# Patient Record
Sex: FEMALE | Race: OTHER | NOT HISPANIC OR LATINO
[De-identification: names, ages, dates, MRNs, and addresses within clinical notes are randomized per-mention and may not be internally consistent; named-entity substitution may affect disease eponyms.]

---

## 2017-03-20 ENCOUNTER — APPOINTMENT (OUTPATIENT)
Dept: NUCLEAR MEDICINE | Facility: HOSPITAL | Age: 72
End: 2017-03-20

## 2017-03-20 ENCOUNTER — OUTPATIENT (OUTPATIENT)
Dept: OUTPATIENT SERVICES | Facility: HOSPITAL | Age: 72
LOS: 1 days | End: 2017-03-20
Payer: MEDICARE

## 2017-03-20 DIAGNOSIS — R06.00 DYSPNEA, UNSPECIFIED: ICD-10-CM

## 2017-03-20 PROCEDURE — 78582 LUNG VENTILAT&PERFUS IMAGING: CPT | Mod: 26

## 2017-03-20 PROCEDURE — 71046 X-RAY EXAM CHEST 2 VIEWS: CPT

## 2017-03-20 PROCEDURE — 71020: CPT | Mod: 26

## 2017-03-20 PROCEDURE — 78582 LUNG VENTILAT&PERFUS IMAGING: CPT

## 2021-12-10 ENCOUNTER — APPOINTMENT (OUTPATIENT)
Dept: MRI IMAGING | Facility: IMAGING CENTER | Age: 76
End: 2021-12-10
Payer: MEDICARE

## 2021-12-10 ENCOUNTER — OUTPATIENT (OUTPATIENT)
Dept: OUTPATIENT SERVICES | Facility: HOSPITAL | Age: 76
LOS: 1 days | End: 2021-12-10
Payer: MEDICARE

## 2021-12-10 DIAGNOSIS — Z00.8 ENCOUNTER FOR OTHER GENERAL EXAMINATION: ICD-10-CM

## 2021-12-10 PROCEDURE — C8937: CPT

## 2021-12-10 PROCEDURE — 77049 MRI BREAST C-+ W/CAD BI: CPT | Mod: 26,MH

## 2021-12-10 PROCEDURE — A9585: CPT

## 2021-12-10 PROCEDURE — C8908: CPT | Mod: MH

## 2021-12-27 ENCOUNTER — OUTPATIENT (OUTPATIENT)
Dept: OUTPATIENT SERVICES | Facility: HOSPITAL | Age: 76
LOS: 1 days | End: 2021-12-27
Payer: MEDICARE

## 2021-12-27 DIAGNOSIS — C50.919 MALIGNANT NEOPLASM OF UNSPECIFIED SITE OF UNSPECIFIED FEMALE BREAST: ICD-10-CM

## 2021-12-28 ENCOUNTER — RESULT REVIEW (OUTPATIENT)
Age: 76
End: 2021-12-28

## 2021-12-28 PROCEDURE — 88321 CONSLTJ&REPRT SLD PREP ELSWR: CPT

## 2022-01-04 ENCOUNTER — OUTPATIENT (OUTPATIENT)
Dept: OUTPATIENT SERVICES | Facility: HOSPITAL | Age: 77
LOS: 1 days | End: 2022-01-04
Payer: MEDICARE

## 2022-01-04 VITALS
HEART RATE: 84 BPM | RESPIRATION RATE: 16 BRPM | TEMPERATURE: 98 F | DIASTOLIC BLOOD PRESSURE: 80 MMHG | OXYGEN SATURATION: 98 % | WEIGHT: 139.99 LBS | SYSTOLIC BLOOD PRESSURE: 110 MMHG | HEIGHT: 64 IN

## 2022-01-04 DIAGNOSIS — Z87.09 PERSONAL HISTORY OF OTHER DISEASES OF THE RESPIRATORY SYSTEM: ICD-10-CM

## 2022-01-04 DIAGNOSIS — I10 ESSENTIAL (PRIMARY) HYPERTENSION: ICD-10-CM

## 2022-01-04 DIAGNOSIS — E89.2 POSTPROCEDURAL HYPOPARATHYROIDISM: Chronic | ICD-10-CM

## 2022-01-04 DIAGNOSIS — E03.9 HYPOTHYROIDISM, UNSPECIFIED: ICD-10-CM

## 2022-01-04 DIAGNOSIS — C50.912 MALIGNANT NEOPLASM OF UNSPECIFIED SITE OF LEFT FEMALE BREAST: ICD-10-CM

## 2022-01-04 DIAGNOSIS — Z98.890 OTHER SPECIFIED POSTPROCEDURAL STATES: Chronic | ICD-10-CM

## 2022-01-04 DIAGNOSIS — C50.919 MALIGNANT NEOPLASM OF UNSPECIFIED SITE OF UNSPECIFIED FEMALE BREAST: ICD-10-CM

## 2022-01-04 LAB
A1C WITH ESTIMATED AVERAGE GLUCOSE RESULT: 5.9 % — HIGH (ref 4–5.6)
ANION GAP SERPL CALC-SCNC: 11 MMOL/L — SIGNIFICANT CHANGE UP (ref 7–14)
BUN SERPL-MCNC: 22 MG/DL — SIGNIFICANT CHANGE UP (ref 7–23)
CALCIUM SERPL-MCNC: 9.7 MG/DL — SIGNIFICANT CHANGE UP (ref 8.4–10.5)
CHLORIDE SERPL-SCNC: 100 MMOL/L — SIGNIFICANT CHANGE UP (ref 98–107)
CO2 SERPL-SCNC: 25 MMOL/L — SIGNIFICANT CHANGE UP (ref 22–31)
CREAT SERPL-MCNC: 0.87 MG/DL — SIGNIFICANT CHANGE UP (ref 0.5–1.3)
ESTIMATED AVERAGE GLUCOSE: 123 — SIGNIFICANT CHANGE UP
GLUCOSE SERPL-MCNC: 89 MG/DL — SIGNIFICANT CHANGE UP (ref 70–99)
HCT VFR BLD CALC: 41.6 % — SIGNIFICANT CHANGE UP (ref 34.5–45)
HGB BLD-MCNC: 14.1 G/DL — SIGNIFICANT CHANGE UP (ref 11.5–15.5)
MCHC RBC-ENTMCNC: 30.5 PG — SIGNIFICANT CHANGE UP (ref 27–34)
MCHC RBC-ENTMCNC: 33.9 GM/DL — SIGNIFICANT CHANGE UP (ref 32–36)
MCV RBC AUTO: 89.8 FL — SIGNIFICANT CHANGE UP (ref 80–100)
NRBC # BLD: 0 /100 WBCS — SIGNIFICANT CHANGE UP
NRBC # FLD: 0 K/UL — SIGNIFICANT CHANGE UP
PLATELET # BLD AUTO: 244 K/UL — SIGNIFICANT CHANGE UP (ref 150–400)
POTASSIUM SERPL-MCNC: 3.6 MMOL/L — SIGNIFICANT CHANGE UP (ref 3.5–5.3)
POTASSIUM SERPL-SCNC: 3.6 MMOL/L — SIGNIFICANT CHANGE UP (ref 3.5–5.3)
RBC # BLD: 4.63 M/UL — SIGNIFICANT CHANGE UP (ref 3.8–5.2)
RBC # FLD: 13.4 % — SIGNIFICANT CHANGE UP (ref 10.3–14.5)
SODIUM SERPL-SCNC: 136 MMOL/L — SIGNIFICANT CHANGE UP (ref 135–145)
SURGICAL PATHOLOGY STUDY: SIGNIFICANT CHANGE UP
WBC # BLD: 6.67 K/UL — SIGNIFICANT CHANGE UP (ref 3.8–10.5)
WBC # FLD AUTO: 6.67 K/UL — SIGNIFICANT CHANGE UP (ref 3.8–10.5)

## 2022-01-04 PROCEDURE — 93010 ELECTROCARDIOGRAM REPORT: CPT

## 2022-01-04 RX ORDER — LEVOTHYROXINE SODIUM 125 MCG
1 TABLET ORAL
Qty: 0 | Refills: 0 | DISCHARGE

## 2022-01-04 NOTE — H&P PST ADULT - HISTORY OF PRESENT ILLNESS
Pt is a 76 yr old female scheduled for left Breast Lumpectomy Post Mag Seed Placement at 1 site, Left [place] Lymph Node Biopsy with Dr Cheung tentatively 1/18/22 - pt had Mammo and had Biopsy done - now for surgery to remove lump - pt denies pain or swelling - Pt hx Hypothyroid, sarcoidosis, HLD, COPD, asthma   Patient instructed to contact surgeon's office concerning COVID test prior to surgery    Pt is a 76 yr old female scheduled for left Breast Lumpectomy Post Mag Seed Placement at 1 site, Left [place] Lymph Node Biopsy with Dr Cheung tentatively 1/18/22 - pt had Mammo and had Biopsy done - now for surgery to remove lump - pt denies pain or swelling - Pt hx Hypothyroid, sarcoidosis, HLD, COPD, asthma, sleep apnea   Patient instructed to contact surgeon's office concerning COVID test prior to surgery    Pt is a 76 yr old female scheduled for left Breast Lumpectomy Post Mag Seed Placement at 1 site, Left [place] Lymph Node Biopsy with Dr Cheung tentatively 1/18/22 - pt had Mammo with abnormal results  and had Biopsy done - now for surgery to remove lump - pt denies pain or swelling - Pt hx Hypothyroid, sarcoidosis, HLD, COPD, asthma, sleep apnea   Patient instructed to contact surgeon's office concerning COVID test prior to surgery   Call to Dr Payne to confirm patient may be done in [facility]

## 2022-01-04 NOTE — H&P PST ADULT - NSICDXPASTMEDICALHX_GEN_ALL_CORE_FT
PAST MEDICAL HISTORY:  Age related osteoporosis     Anxiety     Asthma     Breast CA     DM (diabetes mellitus)     H/O parathyroidectomy     H/O varicose vein ligation and stripping right leg    HTN (hypertension)     Hyperthyroidism Rx - Radioactive Iodine - 1970s    IBS (irritable bowel syndrome)     Parathyroid disorder     Sarcoidosis of lung since in early 20's - no treatment since    Sickle cell trait     Uterine fibroid      PAST MEDICAL HISTORY:  Age related osteoporosis     Anxiety     Asthma     Breast CA     DM (diabetes mellitus)     H/O parathyroidectomy     H/O varicose vein ligation and stripping right leg    History of COPD     HLD (hyperlipidemia)     HTN (hypertension)     Hyperthyroidism Rx - Radioactive Iodine - 1970s    Hypothyroid     IBS (irritable bowel syndrome)     Parathyroid disorder     Sarcoidosis of lung since in early 20's - no treatment since    Sickle cell trait     Sleep apnea mild - no appliance used    Uterine fibroid

## 2022-01-04 NOTE — H&P PST ADULT - MUSCULOSKELETAL COMMENTS
Pt c/o of lower back and neck pain with certain movements Pt c/o of lower back and neck pain with certain movements - Hx right knee replacement 2011

## 2022-01-04 NOTE — H&P PST ADULT - ACTIVITY
do all house chores, shopping walk to work daily x 30 mins do all house chores, shopping,  walk daily,

## 2022-01-04 NOTE — H&P PST ADULT - PROBLEM SELECTOR PLAN 1
Pt scheduled for surgery left Breast Lumpectomy Post Mag Seed Placement at 1 site, Left [place] Lymph Node Biopsy with Dr Cheung tentatively 1/18/22 and preop instructions including instructions for taking Famotidine and for Chlorhexidine use in showering on the day of surgery, given verbally and with use of  written materials, and patient confirming understanding of such instructions using  teach back method.  Pt to see PCP for MC - hx Sarcoidosis, hypothyroid, COPD  Pt to see Cardio for CC - abnormal EKG - call to Cardio to inform need of appt and pt will call today - faxed EKG to office and they have faxed back most recent EKG  Call to confirm with Dr Payne that pt can be done in PST ( sleep apnea hx )

## 2022-01-04 NOTE — H&P PST ADULT - NSICDXPASTSURGICALHX_GEN_ALL_CORE_FT
PAST SURGICAL HISTORY:  Knee joint replacement status Right - 2011    S/P laparoscopic cholecystectomy 2010     PAST SURGICAL HISTORY:  H/O parathyroidectomy     History of lung biopsy 1975    Knee joint replacement status Right - 2011    S/P laparoscopic cholecystectomy 2010

## 2022-01-04 NOTE — H&P PST ADULT - ENDOCRINE COMMENTS
Pt hx Hyperthyroid 1970s - FORREST tx and now followed by Endo with recent TFTs done - pt stable on current dosing - hx Parathyroidectomy and PreDM Pt hx Hyperthyroid 1970s - FORREST tx and now followed by Endo with recent TFTs done - pt stable on current dosing - hx Parathyroidectomy 2020 and PreDM ( last A1c 5.8)

## 2022-01-04 NOTE — H&P PST ADULT - MUSCULOSKELETAL
details… Lower back pain with certain movements/normal strength/decreased ROM due to pain detailed exam

## 2022-01-11 ENCOUNTER — OUTPATIENT (OUTPATIENT)
Dept: OUTPATIENT SERVICES | Facility: HOSPITAL | Age: 77
LOS: 1 days | End: 2022-01-11
Payer: MEDICARE

## 2022-01-11 ENCOUNTER — APPOINTMENT (OUTPATIENT)
Dept: ULTRASOUND IMAGING | Facility: IMAGING CENTER | Age: 77
End: 2022-01-11
Payer: MEDICARE

## 2022-01-11 DIAGNOSIS — E89.2 POSTPROCEDURAL HYPOPARATHYROIDISM: Chronic | ICD-10-CM

## 2022-01-11 DIAGNOSIS — Z98.890 OTHER SPECIFIED POSTPROCEDURAL STATES: Chronic | ICD-10-CM

## 2022-01-11 DIAGNOSIS — Z00.8 ENCOUNTER FOR OTHER GENERAL EXAMINATION: ICD-10-CM

## 2022-01-11 PROBLEM — G47.30 SLEEP APNEA, UNSPECIFIED: Chronic | Status: ACTIVE | Noted: 2022-01-04

## 2022-01-11 PROBLEM — E03.9 HYPOTHYROIDISM, UNSPECIFIED: Chronic | Status: ACTIVE | Noted: 2022-01-04

## 2022-01-11 PROBLEM — Z87.09 PERSONAL HISTORY OF OTHER DISEASES OF THE RESPIRATORY SYSTEM: Chronic | Status: ACTIVE | Noted: 2022-01-04

## 2022-01-11 PROBLEM — C50.919 MALIGNANT NEOPLASM OF UNSPECIFIED SITE OF UNSPECIFIED FEMALE BREAST: Chronic | Status: ACTIVE | Noted: 2022-01-04

## 2022-01-11 PROBLEM — E78.5 HYPERLIPIDEMIA, UNSPECIFIED: Chronic | Status: ACTIVE | Noted: 2022-01-04

## 2022-01-11 PROCEDURE — 19285 PERQ DEV BREAST 1ST US IMAG: CPT | Mod: LT

## 2022-01-11 PROCEDURE — C1739: CPT

## 2022-01-11 PROCEDURE — 19285 PERQ DEV BREAST 1ST US IMAG: CPT

## 2022-01-14 VITALS
HEART RATE: 93 BPM | RESPIRATION RATE: 18 BRPM | SYSTOLIC BLOOD PRESSURE: 129 MMHG | TEMPERATURE: 98 F | DIASTOLIC BLOOD PRESSURE: 88 MMHG | WEIGHT: 141.1 LBS

## 2022-01-15 ENCOUNTER — TRANSCRIPTION ENCOUNTER (OUTPATIENT)
Age: 77
End: 2022-01-15

## 2022-01-17 ENCOUNTER — TRANSCRIPTION ENCOUNTER (OUTPATIENT)
Age: 77
End: 2022-01-17

## 2022-01-18 ENCOUNTER — RESULT REVIEW (OUTPATIENT)
Age: 77
End: 2022-01-18

## 2022-01-18 ENCOUNTER — APPOINTMENT (OUTPATIENT)
Dept: MAMMOGRAPHY | Facility: IMAGING CENTER | Age: 77
End: 2022-01-18
Payer: MEDICARE

## 2022-01-18 ENCOUNTER — APPOINTMENT (OUTPATIENT)
Dept: NUCLEAR MEDICINE | Facility: IMAGING CENTER | Age: 77
End: 2022-01-18
Payer: MEDICARE

## 2022-01-18 ENCOUNTER — OUTPATIENT (OUTPATIENT)
Dept: OUTPATIENT SERVICES | Facility: HOSPITAL | Age: 77
LOS: 1 days | End: 2022-01-18
Payer: COMMERCIAL

## 2022-01-18 ENCOUNTER — OUTPATIENT (OUTPATIENT)
Dept: OUTPATIENT SERVICES | Facility: HOSPITAL | Age: 77
LOS: 1 days | Discharge: HOME | End: 2022-01-18
Payer: MEDICARE

## 2022-01-18 VITALS — OXYGEN SATURATION: 94 % | SYSTOLIC BLOOD PRESSURE: 112 MMHG | HEART RATE: 75 BPM | DIASTOLIC BLOOD PRESSURE: 67 MMHG

## 2022-01-18 DIAGNOSIS — E89.2 POSTPROCEDURAL HYPOPARATHYROIDISM: Chronic | ICD-10-CM

## 2022-01-18 DIAGNOSIS — Z00.8 ENCOUNTER FOR OTHER GENERAL EXAMINATION: ICD-10-CM

## 2022-01-18 DIAGNOSIS — Z98.890 OTHER SPECIFIED POSTPROCEDURAL STATES: Chronic | ICD-10-CM

## 2022-01-18 DIAGNOSIS — C50.912 MALIGNANT NEOPLASM OF UNSPECIFIED SITE OF LEFT FEMALE BREAST: ICD-10-CM

## 2022-01-18 LAB — GLUCOSE BLDC GLUCOMTR-MCNC: 94 MG/DL — SIGNIFICANT CHANGE UP (ref 70–99)

## 2022-01-18 PROCEDURE — 88305 TISSUE EXAM BY PATHOLOGIST: CPT | Mod: 26

## 2022-01-18 PROCEDURE — 88307 TISSUE EXAM BY PATHOLOGIST: CPT | Mod: 26

## 2022-01-18 PROCEDURE — 76098 X-RAY EXAM SURGICAL SPECIMEN: CPT | Mod: 26

## 2022-01-18 PROCEDURE — A9541: CPT

## 2022-01-18 PROCEDURE — 76098 X-RAY EXAM SURGICAL SPECIMEN: CPT

## 2022-01-18 DEVICE — CLIP APPLIER COVIDIEN SURGICLIP 11.5" MEDIUM: Type: IMPLANTABLE DEVICE | Site: LEFT | Status: FUNCTIONAL

## 2022-01-18 RX ORDER — SODIUM CHLORIDE 9 MG/ML
1000 INJECTION, SOLUTION INTRAVENOUS
Refills: 0 | Status: DISCONTINUED | OUTPATIENT
Start: 2022-01-18 | End: 2022-02-01

## 2022-01-18 RX ORDER — LOSARTAN/HYDROCHLOROTHIAZIDE 100MG-25MG
1 TABLET ORAL
Qty: 0 | Refills: 0 | DISCHARGE

## 2022-01-18 RX ORDER — ALBUTEROL 90 UG/1
2 AEROSOL, METERED ORAL
Qty: 0 | Refills: 0 | DISCHARGE

## 2022-01-18 RX ORDER — ASCORBIC ACID 60 MG
1 TABLET,CHEWABLE ORAL
Qty: 0 | Refills: 0 | DISCHARGE

## 2022-01-18 RX ORDER — CALCIUM CARBONATE 500(1250)
0 TABLET ORAL
Qty: 0 | Refills: 0 | DISCHARGE

## 2022-01-18 RX ORDER — LEVOTHYROXINE SODIUM 125 MCG
1 TABLET ORAL
Qty: 0 | Refills: 0 | DISCHARGE

## 2022-01-18 RX ORDER — MONTELUKAST 4 MG/1
1 TABLET, CHEWABLE ORAL
Qty: 0 | Refills: 0 | DISCHARGE

## 2022-01-18 RX ORDER — ZOLPIDEM TARTRATE 10 MG/1
1 TABLET ORAL
Qty: 0 | Refills: 0 | DISCHARGE

## 2022-01-18 RX ORDER — CHOLECALCIFEROL (VITAMIN D3) 125 MCG
1 CAPSULE ORAL
Qty: 0 | Refills: 0 | DISCHARGE

## 2022-01-18 RX ORDER — FLUTICASONE FUROATE AND VILANTEROL TRIFENATATE 100; 25 UG/1; UG/1
1 POWDER RESPIRATORY (INHALATION)
Qty: 0 | Refills: 0 | DISCHARGE

## 2022-01-18 RX ORDER — ASPIRIN/CALCIUM CARB/MAGNESIUM 324 MG
1 TABLET ORAL
Qty: 0 | Refills: 0 | DISCHARGE

## 2022-01-18 RX ORDER — UBIDECARENONE 100 MG
1 CAPSULE ORAL
Qty: 0 | Refills: 0 | DISCHARGE

## 2022-01-18 RX ORDER — ACETAMINOPHEN 500 MG
2 TABLET ORAL
Qty: 0 | Refills: 0 | DISCHARGE

## 2022-01-18 RX ORDER — SIMVASTATIN 20 MG/1
1 TABLET, FILM COATED ORAL
Qty: 0 | Refills: 0 | DISCHARGE

## 2022-01-18 RX ORDER — PREGABALIN 225 MG/1
1 CAPSULE ORAL
Qty: 0 | Refills: 0 | DISCHARGE

## 2022-01-18 RX ORDER — ALPRAZOLAM 0.25 MG
1 TABLET ORAL
Qty: 0 | Refills: 0 | DISCHARGE

## 2022-01-18 NOTE — ASU DISCHARGE PLAN (ADULT/PEDIATRIC) - NS MD DC FALL RISK RISK
For information on Fall & Injury Prevention, visit: [email or web address] OR  [email or web address] OR  https://www.cdc.gov/steadi/patient.html

## 2022-01-18 NOTE — ASU DISCHARGE PLAN (ADULT/PEDIATRIC) - ASU DC SPECIAL INSTRUCTIONSFT
1. Medications/Pain Management  - Restart all of your regular medications unless specifically told otherwise  - It is easier to control pain by taking medications before the pain becomes severe  - We recommend taking the following medications on a regular schedule:                   * Tylenol 500mg 2 tabs every 6 hours                   * Ibuprofen 200mg 2 tabs every 6 hours (with food), if needed                   * Please alternate your pain medication every 3 hours     2. Incision and Dressing Care  - Please take the prescribed antibiotic, Cefadroxil 500mg, every 12 hours for the next week until you see your surgeon at your follow-up appointment.   - Wear your supportive bra until you follow-up with Dr. Cheung, it will help minimize post-operative bleeding and swelling. You can wear the bra to bed at night.   - Your incision has both sutures and steri-strips (small, white pieces of tape) or sutures and special skin glue.   - The sutures will be absorbed by your skin over time.  - Steri-strips will fall off on their own or be removed in the office. The skin glue will gradually wear away.  - You may shower in 24 hours after surgery. When showering, you may use soap and water. Do not scrub or soak in a bath. Pat dry.    3. Diet: Resume your regular diet as tolerated.    4. Activity  - Avoid strenuous activity, heavy lifting (>15 pounds) and vigorous exercise until seen in follow-up.  - Walking and most daily activity may be resumed the day after surgery.  - Most people return to desk-type work in 1-2 days. This will depend on how you feel and what type of work you do.    5. Follow-up Care  - Pathology results are usually available in 1-2 weeks.  - You will receive your results by phone or at your follow-up appointment  - If your follow-up appointment was not made prior to your surgery, please call the office to schedule an appointment for 1 week after your surgery.    6. Call the office if:  - Pain is not relieved by medication  - Fever is greater than 100.4F or chills  - Persistent bleeding or drainage from your incision  - Persistent swelling or increasing redness around the incision  - Any other questions or concerns

## 2022-01-18 NOTE — BRIEF OPERATIVE NOTE - NSICDXBRIEFPROCEDURE_GEN_ALL_CORE_FT
PROCEDURES:  Breast lumpectomy with sentinel node biopsy 18-Jan-2022 16:00:03 Left Breast Jovanny Gilman

## 2022-01-18 NOTE — ASU DISCHARGE PLAN (ADULT/PEDIATRIC) - CARE PROVIDER_API CALL
Jonatan Samantha  SURGERY  [address] [place] [address], [address]  [place] [address]  Phone: [phone]  Fax: [phone]  Established Patient  Follow Up Time: 1 week

## 2022-01-18 NOTE — ASU DISCHARGE PLAN (ADULT/PEDIATRIC) - FOLLOW UP APPOINTMENTS
[facility] ([facility])

## 2022-01-18 NOTE — BRIEF OPERATIVE NOTE - OPERATION/FINDINGS
Left breast lumpectomy with SLNBx. Linear incision was made along the left lateral axilla. SLNs were identified via Gamma probe and Isosulfane Blue Dye. Curvilinear incision made along the left lateral curvature of the left areola; lumpectomy was resected via Magseed guidance. Primary closure. EBL=10 cc.

## 2022-01-24 LAB — SURGICAL PATHOLOGY STUDY: SIGNIFICANT CHANGE UP

## 2022-02-01 ENCOUNTER — OUTPATIENT (OUTPATIENT)
Dept: OUTPATIENT SERVICES | Facility: HOSPITAL | Age: 77
LOS: 1 days | Discharge: HOME | End: 2022-02-01

## 2022-02-01 DIAGNOSIS — Z80.3 FAMILY HISTORY OF MALIGNANT NEOPLASM OF BREAST: ICD-10-CM

## 2022-02-01 DIAGNOSIS — E89.2 POSTPROCEDURAL HYPOPARATHYROIDISM: Chronic | ICD-10-CM

## 2022-02-01 DIAGNOSIS — Z98.890 OTHER SPECIFIED POSTPROCEDURAL STATES: Chronic | ICD-10-CM

## 2022-02-01 DIAGNOSIS — Z86.2 PERSONAL HISTORY OF DISEASES OF THE BLOOD AND BLOOD-FORMING ORGANS AND CERTAIN DISORDERS INVOLVING THE IMMUNE MECHANISM: ICD-10-CM

## 2022-02-01 DIAGNOSIS — E03.9 HYPOTHYROIDISM, UNSPECIFIED: ICD-10-CM

## 2022-02-01 DIAGNOSIS — I10 ESSENTIAL (PRIMARY) HYPERTENSION: ICD-10-CM

## 2022-02-01 DIAGNOSIS — C50.919 MALIGNANT NEOPLASM OF UNSPECIFIED SITE OF UNSPECIFIED FEMALE BREAST: ICD-10-CM

## 2022-02-01 DIAGNOSIS — J44.9 CHRONIC OBSTRUCTIVE PULMONARY DISEASE, UNSPECIFIED: ICD-10-CM

## 2022-02-01 RX ORDER — CALCIUM CARBONATE/VITAMIN D3 600 MG-10
TABLET ORAL
Refills: 0 | Status: ACTIVE | COMMUNITY

## 2022-02-01 RX ORDER — ASPIRIN 81 MG
81 TABLET, DELAYED RELEASE (ENTERIC COATED) ORAL DAILY
Refills: 0 | Status: ACTIVE | COMMUNITY

## 2022-02-01 RX ORDER — MONTELUKAST SODIUM 10 MG/1
10 TABLET, FILM COATED ORAL DAILY
Refills: 0 | Status: ACTIVE | COMMUNITY

## 2022-02-01 RX ORDER — DENOSUMAB 60 MG/ML
60 INJECTION SUBCUTANEOUS
Refills: 0 | Status: ACTIVE | COMMUNITY

## 2022-02-01 RX ORDER — LOSARTAN POTASSIUM AND HYDROCHLOROTHIAZIDE 12.5; 5 MG/1; MG/1
50-12.5 TABLET ORAL DAILY
Refills: 0 | Status: ACTIVE | COMMUNITY

## 2022-02-01 RX ORDER — THIAMINE HCL 100 MG
TABLET ORAL
Refills: 0 | Status: ACTIVE | COMMUNITY

## 2022-02-01 RX ORDER — ASCORBIC ACID 500 MG
TABLET ORAL DAILY
Refills: 0 | Status: ACTIVE | COMMUNITY

## 2022-02-01 RX ORDER — CALCIUM CARBONATE 500 MG/1
TABLET, CHEWABLE ORAL
Refills: 0 | Status: ACTIVE | COMMUNITY

## 2022-02-01 RX ORDER — MULTIVITAMIN
TABLET ORAL DAILY
Refills: 0 | Status: ACTIVE | COMMUNITY

## 2022-02-01 RX ORDER — VITAMIN E 268 MG
100 CAPSULE ORAL
Refills: 0 | Status: ACTIVE | COMMUNITY

## 2022-02-01 RX ORDER — LEVOTHYROXINE SODIUM 0.07 MG/1
75 TABLET ORAL
Refills: 0 | Status: ACTIVE | COMMUNITY

## 2022-02-02 ENCOUNTER — APPOINTMENT (OUTPATIENT)
Dept: HEMATOLOGY ONCOLOGY | Facility: CLINIC | Age: 77
End: 2022-02-02
Payer: MEDICARE

## 2022-02-02 VITALS
HEIGHT: 64.96 IN | RESPIRATION RATE: 16 BRPM | SYSTOLIC BLOOD PRESSURE: 130 MMHG | DIASTOLIC BLOOD PRESSURE: 86 MMHG | TEMPERATURE: 97.8 F | HEART RATE: 86 BPM | WEIGHT: 141.1 LBS | OXYGEN SATURATION: 95 % | BODY MASS INDEX: 23.51 KG/M2

## 2022-02-02 PROCEDURE — 99205 OFFICE O/P NEW HI 60 MIN: CPT

## 2022-02-12 NOTE — HISTORY OF PRESENT ILLNESS
[de-identified] : Age 76: left breast cancer\par Screen detected: she had screening mammogram done on 1/31/2021 which showed left breast 3:00 6 cm FN 0.7 x 1.3 cm hypoechoic nodule for which 6 month follow up was set up. She had 6 month follow up evaluation and had L breast FNA showing malignant cells: invasive moderately differentiated ductal carcinoma measuring at least 0.4 cm , %, [place] 80%, Fox0mwm negative, Ki67 20%  and 5:00 nodular dense stromal fibrosis. She underwent left lumpectomy with SLNB with Dr Cheung on 1/18/2022. The pathology showed in the left posterior margin: atypical lobular hyperplasia; invasive moderately differentiated ductal carcinoma measuring 6 mm and no LVI; SBR 7/9, negative sentinel lymph nodes (0/2). She had Mammoprint done which showed high risk index of -0.029; luminal B type.  [de-identified] : invasive moderately differentiated ductal carcinoma %, [place] 80%, Mub1mxh negative [de-identified] : She is present to review adjuvant recommendations. She has seen Dr Cazares for RT evaluation. She has history of neuropathy of the hands felt to be due to prediabetes and carpal tunnel: she had severe pain in the hands but was able to improve with gabapentin. She had been on oral methotrexate for arthritis and feels it may have caused pulmonary changes although she also has sarcoid and felt due to sarcoid. She denies any worsening neuropathy of the hands or arthritis.

## 2022-02-12 NOTE — REVIEW OF SYSTEMS
[Skin Rash] : no skin rash [Skin Wound] : no skin wound [Negative] : Allergic/Immunologic [de-identified] : tenderness over the lumpectomy site

## 2022-02-12 NOTE — ASSESSMENT
[FreeTextEntry1] : She is a 77 y/o F with Stage I left breast cancer ER/KY positive and Oct2irk negative. We reviewed the role of genomic testing to evaluate future breast cancer recurrence and the benefit of adjuvant chemotherapy. We explained significance of her Mammoprint score and reviewed recommendations of adjuvant chemotherapy. We reviewed her history of neuropathy and concern of long term side effects from chemotherapy. We would recommend CMF every 2 weeks x 8 compared to TC every 3 weeks to avoid chance of worsening and chronic neuropathy. We reviewed potential side effects including but not limited to: fatigue, low blood counts, GI upset, myalgias and arthralgias. We reviewed supportive measures to decrease side effects. We reviewed role of adjuvant therapy to reduce risk of breast cancer recurrence. While she is willing to consider chemotherapy, she is wondering if another genomic test would still have high risk score given her small tumor and high ER/KY positivity. We will send OncotypeDx to re-evaluate breast cancer recurrence risk. \par \par We reviewed adjuvant treatment: chemotherapy followed by RT and then endocrine therapy with anastrozole daily for 5 to 10 years. We reviewed potential SE of endocrine therapy. Questions answered to her satisfaction. Will have follow up after Oncotype results return.

## 2022-02-12 NOTE — CONSULT LETTER
[Dear  ___] : Dear  [unfilled], [Consult Letter:] : I had the pleasure of evaluating your patient, [unfilled]. [( Thank you for referring [unfilled] for consultation for _____ )] : Thank you for referring [unfilled] for consultation for [unfilled] [Please see my note below.] : Please see my note below. [Consult Closing:] : Thank you very much for allowing me to participate in the care of this patient.  If you have any questions, please do not hesitate to contact me. [Sincerely,] : Sincerely, [FreeTextEntry2] : Samantha Cheung MD\par [address] [facility] [address]\[place] [address] [FreeTextEntry3] : Tony Tang MD\par Attending\par [facility]\par  [DrGemma  ___] : Dr. YEH [DrGemma ___] : Dr. YEH

## 2022-02-12 NOTE — PHYSICAL EXAM
[Fully active, able to carry on all pre-disease performance without restriction] : Status 0 - Fully active, able to carry on all pre-disease performance without restriction [Normal] : affect appropriate [de-identified] : lumpectomy site healed with mild edema and SLN biopsy site

## 2022-02-22 ENCOUNTER — NON-APPOINTMENT (OUTPATIENT)
Age: 77
End: 2022-02-22

## 2022-02-22 ENCOUNTER — APPOINTMENT (OUTPATIENT)
Dept: HEMATOLOGY ONCOLOGY | Facility: CLINIC | Age: 77
End: 2022-02-22
Payer: MEDICARE

## 2022-02-22 VITALS
SYSTOLIC BLOOD PRESSURE: 128 MMHG | OXYGEN SATURATION: 97 % | RESPIRATION RATE: 17 BRPM | DIASTOLIC BLOOD PRESSURE: 84 MMHG | HEART RATE: 78 BPM | TEMPERATURE: 97.3 F | HEIGHT: 64.96 IN | WEIGHT: 142.86 LBS | BODY MASS INDEX: 23.8 KG/M2

## 2022-02-22 PROCEDURE — 99215 OFFICE O/P EST HI 40 MIN: CPT

## 2022-02-22 RX ORDER — FLUTICASONE FUROATE AND VILANTEROL TRIFENATATE 200; 25 UG/1; UG/1
200-25 POWDER RESPIRATORY (INHALATION)
Qty: 60 | Refills: 0 | Status: ACTIVE | COMMUNITY
Start: 2021-10-22

## 2022-02-22 RX ORDER — LEVOTHYROXINE SODIUM 88 UG/1
88 TABLET ORAL
Qty: 12 | Refills: 0 | Status: ACTIVE | COMMUNITY
Start: 2021-10-13

## 2022-02-22 NOTE — REASON FOR VISIT
[Follow-Up Visit] : a follow-up [Family Member] : family member [FreeTextEntry2] : follow up for breast cancer

## 2022-02-22 NOTE — HISTORY OF PRESENT ILLNESS
[Disease: _____________________] : Disease: [unfilled] [T: ___] : T[unfilled] [N: ___] : N[unfilled] [AJCC Stage: ____] : AJCC Stage: [unfilled] [de-identified] : Age 76: left breast cancer\par Screen detected: she had screening mammogram done on 1/31/2021 which showed left breast 3:00 6 cm FN 0.7 x 1.3 cm hypoechoic nodule for which 6 month follow up was set up. She had 6 month follow up evaluation and had L breast FNA showing malignant cells: invasive moderately differentiated ductal carcinoma measuring at least 0.4 cm , %, [place] 80%, Kja4iid negative, Ki67 20%  and 5:00 nodular dense stromal fibrosis. She underwent left lumpectomy with SLNB with Dr Cheung on 1/18/2022. The pathology showed in the left posterior margin: atypical lobular hyperplasia; invasive moderately differentiated ductal carcinoma measuring 6 mm and no LVI; SBR 7/9, negative sentinel lymph nodes (0/2). She had Mammoprint done which showed high risk index of -0.029; luminal B type. We met with her and given low clinical risk, we sent OncotypeDx for further evaluation: score was 1 indicating 9 year distant recurrence to be 3% and chemotherapy benefit of less than 1%.  [de-identified] : invasive moderately differentiated ductal carcinoma %, [place] 80%, Ypn3xzp negative [de-identified] : She is present with her cousin to review the Oncotype results and to review adjuvant recommendations. She will be seeing her pulmonologist for sarcoid: cough is chronic and unchanged. She is worried about SE from chemotherapy.  [Date: ____________] : Patient's last distress assessment performed on [unfilled]. [8 - Distress Level] : Distress Level: 8 [Referred Patient  to social work for follow-up] : Patient was referred to social work for follow-up

## 2022-02-22 NOTE — PHYSICAL EXAM
[Fully active, able to carry on all pre-disease performance without restriction] : Status 0 - Fully active, able to carry on all pre-disease performance without restriction [Normal] : affect appropriate [de-identified] : normal respiratory effort, no cough or wheezing during visit

## 2022-02-22 NOTE — ASSESSMENT
[FreeTextEntry1] : She is a 75 y/o F with Stage I left breast cancer ER/TX positive and Xtv2tgw negative. We reviewed the discordance between Mammoprint and Oncotype results from her genomic cancer testing: Mammoprint with -0.029 and Oncotype with low score of 1. We reviewed discordance likely due to tumor heterogeneity. In setting of age, comorbidities, and low clinical risk with low Oncotype, we would not recommend chemotherapy. We would recommend RT followed by endocrine therapy: anastrozole. We explained that with tumor heterogeneity and small size of her original tumor and high ER/TX status, the side effects of the chemotherapy would outweigh any potential benefits. Questions answered to her satisfaction. She is relieved of not having chemotherapy and understands role of endocrine therapy and surveillance for new signs or symptoms of breast cancer recurrence. She will follow up with Dr Cazares this Friday for RT. Will touch base mid March to see if RT completed to start anastrozole.

## 2022-02-22 NOTE — CONSULT LETTER
[Dear  ___] : Dear  [unfilled], [Courtesy Letter:] : I had the pleasure of seeing your patient, [unfilled], in my office today. [Please see my note below.] : Please see my note below. [Consult Closing:] : Thank you very much for allowing me to participate in the care of this patient.  If you have any questions, please do not hesitate to contact me. [Sincerely,] : Sincerely, [DrGemma  ___] : Dr. YEH [DrGemma ___] : Dr. YHE [___] : [unfilled] [FreeTextEntry2] : Samantha Cheung MD\par [address] [facility] [address]\[place] [address]  [FreeTextEntry3] : Tony Tang MD\par Attending\par [facility]\par

## 2022-02-22 NOTE — REVIEW OF SYSTEMS
[Cough] : cough [Negative] : Allergic/Immunologic [Shortness Of Breath] : no shortness of breath [Wheezing] : no wheezing [SOB on Exertion] : no shortness of breath during exertion

## 2022-04-17 ENCOUNTER — OUTPATIENT (OUTPATIENT)
Dept: OUTPATIENT SERVICES | Facility: HOSPITAL | Age: 77
LOS: 1 days | Discharge: HOME | End: 2022-04-17

## 2022-04-17 DIAGNOSIS — E89.2 POSTPROCEDURAL HYPOPARATHYROIDISM: Chronic | ICD-10-CM

## 2022-04-17 DIAGNOSIS — C50.919 MALIGNANT NEOPLASM OF UNSPECIFIED SITE OF UNSPECIFIED FEMALE BREAST: ICD-10-CM

## 2022-04-17 DIAGNOSIS — Z98.890 OTHER SPECIFIED POSTPROCEDURAL STATES: Chronic | ICD-10-CM

## 2022-04-20 ENCOUNTER — APPOINTMENT (OUTPATIENT)
Dept: HEMATOLOGY ONCOLOGY | Facility: CLINIC | Age: 77
End: 2022-04-20
Payer: MEDICARE

## 2022-04-20 VITALS
OXYGEN SATURATION: 96 % | HEART RATE: 80 BPM | TEMPERATURE: 97.3 F | RESPIRATION RATE: 16 BRPM | BODY MASS INDEX: 23.69 KG/M2 | HEIGHT: 64.96 IN | SYSTOLIC BLOOD PRESSURE: 116 MMHG | WEIGHT: 142.2 LBS | DIASTOLIC BLOOD PRESSURE: 79 MMHG

## 2022-04-20 PROCEDURE — 99214 OFFICE O/P EST MOD 30 MIN: CPT

## 2022-04-20 NOTE — ASSESSMENT
[FreeTextEntry1] : She is a 75 y/o F with Stage I left breast cancer ER/LA positive and Wfm2mjp negative. She had low oncotype score which is more concordant with her breast pathology and underwent RT. Reviewed aquaphor to the RT site and maasageo  tte L breast.  Reviewed expectations with anastrozole therapy. We reviewed continued supportive measures and titration of medication to ensure no intolerable SE and see if she has worsening joint pain. She will try MWF and then titrate medication upwards. We reviewed supportive measures to decrease SE. Questions answered to her satisfaction. She is agreeable with plan. Next follow up in 3 months but earlier if any new symptoms.\par

## 2022-04-20 NOTE — HISTORY OF PRESENT ILLNESS
[Disease: _____________________] : Disease: [unfilled] [T: ___] : T[unfilled] [N: ___] : N[unfilled] [AJCC Stage: ____] : AJCC Stage: [unfilled] [de-identified] : Age 76: left breast cancer\par Screen detected: she had screening mammogram done on 1/31/2021 which showed left breast 3:00 6 cm FN 0.7 x 1.3 cm hypoechoic nodule for which 6 month follow up was set up. She had 6 month follow up evaluation and had L breast FNA showing malignant cells: invasive moderately differentiated ductal carcinoma measuring at least 0.4 cm , %, [place] 80%, Bsi5gmq negative, Ki67 20%  and 5:00 nodular dense stromal fibrosis. She underwent left lumpectomy with SLNB with Dr Cheung on 1/18/2022. The pathology showed in the left posterior margin: atypical lobular hyperplasia; invasive moderately differentiated ductal carcinoma measuring 6 mm and no LVI; SBR 7/9, negative sentinel lymph nodes (0/2). She had Mammoprint done which showed high risk index of -0.029; luminal B type. We met with her and given low clinical risk, we sent OncotypeDx for further evaluation: score was 1 indicating 9 year distant recurrence to be 3% and chemotherapy benefit of less than 1%. She had RT with Dr Cazares 3/2022. She started on anastrozole 4/22/2022.  [de-identified] : invasive moderately differentiated ductal carcinoma %, [place] 80%, Sgy1nol negative [de-identified] : anastrozole 4/2022 to present  [de-identified] : She had radiation burn that caused peeling of the skin and she had applied moisturizer to area. She still has residual fatigue: admits she has trouble falling asleep at night. She also has noticed occasional twinge over the breast: has to hold on to breast since it gets heavy. She denies any new health changes.

## 2022-04-20 NOTE — REASON FOR VISIT
[Follow-Up Visit] : a follow-up [Family Member] : family member [FreeTextEntry2] : follow up for breast cancer s/p completion of RT

## 2022-04-20 NOTE — PHYSICAL EXAM
[Fully active, able to carry on all pre-disease performance without restriction] : Status 0 - Fully active, able to carry on all pre-disease performance without restriction [Normal] : affect appropriate [de-identified] : L lumpectomy with swelling over the breast and peeling skin/ hyperpigmentation

## 2022-04-20 NOTE — REVIEW OF SYSTEMS
[Fever] : no fever [Chills] : no chills [Night Sweats] : no night sweats [Fatigue] : fatigue [Skin Rash] : no skin rash [Recent Change In Weight] : ~T no recent weight change [Skin Wound] : no skin wound [Negative] : Allergic/Immunologic [de-identified] : darkness and peeling skin from the L breast and has occasional twinge over the breast

## 2022-05-16 ENCOUNTER — NON-APPOINTMENT (OUTPATIENT)
Age: 77
End: 2022-05-16

## 2022-05-17 ENCOUNTER — NON-APPOINTMENT (OUTPATIENT)
Age: 77
End: 2022-05-17

## 2022-05-18 ENCOUNTER — NON-APPOINTMENT (OUTPATIENT)
Age: 77
End: 2022-05-18

## 2022-05-26 ENCOUNTER — NON-APPOINTMENT (OUTPATIENT)
Age: 77
End: 2022-05-26

## 2022-06-20 ENCOUNTER — NON-APPOINTMENT (OUTPATIENT)
Age: 77
End: 2022-06-20

## 2022-07-19 ENCOUNTER — OUTPATIENT (OUTPATIENT)
Dept: OUTPATIENT SERVICES | Facility: HOSPITAL | Age: 77
LOS: 1 days | Discharge: HOME | End: 2022-07-19

## 2022-07-19 DIAGNOSIS — C50.919 MALIGNANT NEOPLASM OF UNSPECIFIED SITE OF UNSPECIFIED FEMALE BREAST: ICD-10-CM

## 2022-07-19 DIAGNOSIS — E89.2 POSTPROCEDURAL HYPOPARATHYROIDISM: Chronic | ICD-10-CM

## 2022-07-19 DIAGNOSIS — Z98.890 OTHER SPECIFIED POSTPROCEDURAL STATES: Chronic | ICD-10-CM

## 2022-07-29 ENCOUNTER — APPOINTMENT (OUTPATIENT)
Dept: HEMATOLOGY ONCOLOGY | Facility: CLINIC | Age: 77
End: 2022-07-29

## 2022-07-29 VITALS
OXYGEN SATURATION: 97 % | TEMPERATURE: 97.6 F | BODY MASS INDEX: 23.79 KG/M2 | DIASTOLIC BLOOD PRESSURE: 75 MMHG | RESPIRATION RATE: 16 BRPM | HEIGHT: 64.72 IN | HEART RATE: 86 BPM | WEIGHT: 141.07 LBS | SYSTOLIC BLOOD PRESSURE: 110 MMHG

## 2022-07-29 VITALS — DIASTOLIC BLOOD PRESSURE: 81 MMHG | SYSTOLIC BLOOD PRESSURE: 121 MMHG

## 2022-07-29 PROCEDURE — 99214 OFFICE O/P EST MOD 30 MIN: CPT

## 2022-07-29 RX ORDER — ZOLPIDEM TARTRATE 10 MG/1
10 TABLET ORAL
Qty: 30 | Refills: 0 | Status: ACTIVE | COMMUNITY
Start: 2022-07-05

## 2022-07-29 RX ORDER — EPINEPHRINE 0.3 MG/.3ML
0.3 INJECTION INTRAMUSCULAR
Qty: 2 | Refills: 0 | Status: ACTIVE | COMMUNITY
Start: 2021-12-04

## 2022-07-29 NOTE — HISTORY OF PRESENT ILLNESS
[Disease: _____________________] : Disease: [unfilled] [T: ___] : T[unfilled] [N: ___] : N[unfilled] [AJCC Stage: ____] : AJCC Stage: [unfilled] [de-identified] : Age 76: left breast cancer\par Screen detected: she had screening mammogram done on 1/31/2021 which showed left breast 3:00 6 cm FN 0.7 x 1.3 cm hypoechoic nodule for which 6 month follow up was set up. She had 6 month follow up evaluation and had L breast FNA showing malignant cells: invasive moderately differentiated ductal carcinoma measuring at least 0.4 cm , %, [place] 80%, Qfy9tlt negative, Ki67 20%  and 5:00 nodular dense stromal fibrosis. She underwent left lumpectomy with SLNB with Dr Cheung on 1/18/2022. The pathology showed in the left posterior margin: atypical lobular hyperplasia; invasive moderately differentiated ductal carcinoma measuring 6 mm and no LVI; SBR 7/9, negative sentinel lymph nodes (0/2). She had Mammoprint done which showed high risk index of -0.029; luminal B type. We met with her and given low clinical risk, we sent OncotypeDx for further evaluation: score was 1 indicating 9 year distant recurrence to be 3% and chemotherapy benefit of less than 1%. She had RT with Dr Cazares 3/2022. She started on anastrozole 4/22/2022.  [de-identified] : invasive moderately differentiated ductal carcinoma %, [place] 80%, Njy2tow negative [de-identified] : anastrozole 4/2022 to present  [de-identified] : Since last evaluation, she was able to titrate anastrozole to daily and taking at night. Has fatigue and able to sleep more easily on the medication. She has GI upset which she had before but taking GasX as needed. She has sensitivity over the L breast since the RT. Denies any nipple discharge. Has been massaging the breast area. Has occasional joint pain/ muscle cramping over the legs. Wondering about dietary restrictions: have seen soy in many food products. Planning on going on cruise in 2 weeks.

## 2022-07-29 NOTE — REVIEW OF SYSTEMS
[Joint Pain] : joint pain [Joint Stiffness] : no joint stiffness [Muscle Pain] : muscle pain [Muscle Weakness] : no muscle weakness [Skin Rash] : no skin rash [Skin Wound] : no skin wound [Negative] : Allergic/Immunologic [FreeTextEntry9] : occasional leg cramping  [de-identified] : breast tenderness L

## 2022-07-29 NOTE — ASSESSMENT
[FreeTextEntry1] : She is a 77 y/o F with Stage I left breast cancer (T1N0) ER/CT positive and Osx9czg negative. She had low clinical risk breast cancer and had genomic low risk Oncotype. She completed RT with Dr Cazares and has been on anastrozole since 4/2022. We reviewed the muscle cramping/ joint achiness and GI upset. She will try magnesium supplement to help with muscle/ joint achiness. We reviewed if continued GI upset, we may change medication to letrozole. Questions answered to her satisfaction. She will be getting blood work with her endocrinologist and will get copy. Next follow up in 4 months but earlier if any new symptoms.\par

## 2022-07-29 NOTE — PHYSICAL EXAM
[Fully active, able to carry on all pre-disease performance without restriction] : Status 0 - Fully active, able to carry on all pre-disease performance without restriction [Normal] : affect appropriate [de-identified] : L lumpectomy with hyperpigmentation and sensitivity to palpation

## 2022-08-11 ENCOUNTER — NON-APPOINTMENT (OUTPATIENT)
Age: 77
End: 2022-08-11

## 2022-09-10 ENCOUNTER — NON-APPOINTMENT (OUTPATIENT)
Age: 77
End: 2022-09-10

## 2022-11-29 ENCOUNTER — APPOINTMENT (OUTPATIENT)
Dept: MAMMOGRAPHY | Facility: IMAGING CENTER | Age: 77
End: 2022-11-29

## 2022-11-29 ENCOUNTER — APPOINTMENT (OUTPATIENT)
Dept: ULTRASOUND IMAGING | Facility: IMAGING CENTER | Age: 77
End: 2022-11-29

## 2022-11-29 ENCOUNTER — OUTPATIENT (OUTPATIENT)
Dept: OUTPATIENT SERVICES | Facility: HOSPITAL | Age: 77
LOS: 1 days | End: 2022-11-29
Payer: MEDICARE

## 2022-11-29 DIAGNOSIS — Z98.890 OTHER SPECIFIED POSTPROCEDURAL STATES: Chronic | ICD-10-CM

## 2022-11-29 DIAGNOSIS — Z00.8 ENCOUNTER FOR OTHER GENERAL EXAMINATION: ICD-10-CM

## 2022-11-29 DIAGNOSIS — E89.2 POSTPROCEDURAL HYPOPARATHYROIDISM: Chronic | ICD-10-CM

## 2022-11-29 PROCEDURE — G0279: CPT

## 2022-11-29 PROCEDURE — 77066 DX MAMMO INCL CAD BI: CPT

## 2022-11-29 PROCEDURE — 77066 DX MAMMO INCL CAD BI: CPT | Mod: 26

## 2022-11-29 PROCEDURE — G0279: CPT | Mod: 26

## 2022-11-29 PROCEDURE — 76641 ULTRASOUND BREAST COMPLETE: CPT | Mod: 26,50

## 2022-11-29 PROCEDURE — 76641 ULTRASOUND BREAST COMPLETE: CPT

## 2022-11-30 ENCOUNTER — OUTPATIENT (OUTPATIENT)
Dept: OUTPATIENT SERVICES | Facility: HOSPITAL | Age: 77
LOS: 1 days | Discharge: HOME | End: 2022-11-30

## 2022-11-30 DIAGNOSIS — Z98.890 OTHER SPECIFIED POSTPROCEDURAL STATES: Chronic | ICD-10-CM

## 2022-11-30 DIAGNOSIS — C50.919 MALIGNANT NEOPLASM OF UNSPECIFIED SITE OF UNSPECIFIED FEMALE BREAST: ICD-10-CM

## 2022-11-30 DIAGNOSIS — E89.2 POSTPROCEDURAL HYPOPARATHYROIDISM: Chronic | ICD-10-CM

## 2022-12-02 ENCOUNTER — APPOINTMENT (OUTPATIENT)
Dept: HEMATOLOGY ONCOLOGY | Facility: CLINIC | Age: 77
End: 2022-12-02

## 2022-12-02 VITALS
DIASTOLIC BLOOD PRESSURE: 79 MMHG | SYSTOLIC BLOOD PRESSURE: 131 MMHG | TEMPERATURE: 97.2 F | RESPIRATION RATE: 16 BRPM | HEIGHT: 64.96 IN | BODY MASS INDEX: 22.22 KG/M2 | HEART RATE: 89 BPM | OXYGEN SATURATION: 97 % | WEIGHT: 133.38 LBS

## 2022-12-02 PROCEDURE — 99214 OFFICE O/P EST MOD 30 MIN: CPT

## 2022-12-02 NOTE — CONSULT LETTER
[Dear  ___] : Dear  [unfilled], [Courtesy Letter:] : I had the pleasure of seeing your patient, [unfilled], in my office today. [Please see my note below.] : Please see my note below. [Consult Closing:] : Thank you very much for allowing me to participate in the care of this patient.  If you have any questions, please do not hesitate to contact me. [Sincerely,] : Sincerely, [FreeTextEntry2] : Samantha Cheung MD\par [address] \[place] [address]\par  [FreeTextEntry3] : Tony Tang MD\par Attending\par [facility]\par

## 2022-12-02 NOTE — ASSESSMENT
[FreeTextEntry1] : She is a 78 y/o F with Stage I left breast cancer (T1N0) ER/DC positive and Rte0aut negative. She had low clinical risk breast cancer and had genomic low risk Oncotype. She completed RT with Dr Cazares and has been on anastrozole since 4/2022. We reviewed supportive measures for fatigue: she will try calm supplement prior to sleep to see if this will help insomnia. She wants to continue with anastrozole for now. We reviewed if intolerable SE, we can switch. We reviewed calcium and Vitamin D supplementation along with exercise to maintain bone health. Reviewed Prolia continuation if no invasive dental work. Will obtain copy of the blood work from endocrine. We reviewed signs and symptoms of breast cancer recurrence. No new signs or symptoms of recurrence. \par She is up to date with breast imaging done last month. Next follow up in 6 months but earlier if any new symptoms.\par

## 2022-12-02 NOTE — PHYSICAL EXAM
[Fully active, able to carry on all pre-disease performance without restriction] : Status 0 - Fully active, able to carry on all pre-disease performance without restriction [Normal] : affect appropriate [de-identified] : L lumpectomy with hyperpigmentation and sensitivity to palpation  [de-identified] : scratch marks over the chest wall

## 2022-12-02 NOTE — REVIEW OF SYSTEMS
[Fever] : no fever [Chills] : no chills [Night Sweats] : no night sweats [Fatigue] : fatigue [Recent Change In Weight] : ~T recent weight change [Suicidal] : not suicidal [Insomnia] : insomnia [Anxiety] : no anxiety [Depression] : no depression [Negative] : Allergic/Immunologic [FreeTextEntry2] : lost weight

## 2022-12-02 NOTE — HISTORY OF PRESENT ILLNESS
[Disease: _____________________] : Disease: [unfilled] [T: ___] : T[unfilled] [N: ___] : N[unfilled] [AJCC Stage: ____] : AJCC Stage: [unfilled] [de-identified] : Age 76: left breast cancer\par Screen detected: she had screening mammogram done on 1/31/2021 which showed left breast 3:00 6 cm FN 0.7 x 1.3 cm hypoechoic nodule for which 6 month follow up was set up. She had 6 month follow up evaluation and had L breast FNA showing malignant cells: invasive moderately differentiated ductal carcinoma measuring at least 0.4 cm , %, [place] 80%, Ifv5tfh negative, Ki67 20%  and 5:00 nodular dense stromal fibrosis. She underwent left lumpectomy with SLNB with Dr Cheung on 1/18/2022. The pathology showed in the left posterior margin: atypical lobular hyperplasia; invasive moderately differentiated ductal carcinoma measuring 6 mm and no LVI; SBR 7/9, negative sentinel lymph nodes (0/2). She had Mammoprint done which showed high risk index of -0.029; luminal B type. We met with her and given low clinical risk, we sent OncotypeDx for further evaluation: score was 1 indicating 9 year distant recurrence to be 3% and chemotherapy benefit of less than 1%. She had RT with Dr Cazares 3/2022. She started on anastrozole 4/22/2022.  [de-identified] : invasive moderately differentiated ductal carcinoma %, [place] 80%, Vzx0lax negative [de-identified] : anastrozole 4/2022 to present  [de-identified] : Feels tired: has always had issue with sleep but feels this is worse on the anastrozole: goes to bed 1am to 2am: sleeps 2 hours and then gets up. Has been under stress: has seen dermatologist for skin dryness/ itching and started new medication. Saw pulmonologist for sarcoid. She had blood work done by endocrine and wondering if she should have Prolia: will be getting some dental work.

## 2023-03-23 ENCOUNTER — NON-APPOINTMENT (OUTPATIENT)
Age: 78
End: 2023-03-23

## 2023-06-06 ENCOUNTER — OUTPATIENT (OUTPATIENT)
Dept: OUTPATIENT SERVICES | Facility: HOSPITAL | Age: 78
LOS: 1 days | Discharge: HOME | End: 2023-06-06

## 2023-06-06 DIAGNOSIS — C50.919 MALIGNANT NEOPLASM OF UNSPECIFIED SITE OF UNSPECIFIED FEMALE BREAST: ICD-10-CM

## 2023-06-06 DIAGNOSIS — E89.2 POSTPROCEDURAL HYPOPARATHYROIDISM: Chronic | ICD-10-CM

## 2023-06-06 DIAGNOSIS — Z98.890 OTHER SPECIFIED POSTPROCEDURAL STATES: Chronic | ICD-10-CM

## 2023-06-16 ENCOUNTER — APPOINTMENT (OUTPATIENT)
Dept: HEMATOLOGY ONCOLOGY | Facility: CLINIC | Age: 78
End: 2023-06-16

## 2023-06-19 ENCOUNTER — APPOINTMENT (OUTPATIENT)
Dept: HEMATOLOGY ONCOLOGY | Facility: CLINIC | Age: 78
End: 2023-06-19
Payer: MEDICARE

## 2023-06-19 VITALS
WEIGHT: 141.07 LBS | HEART RATE: 83 BPM | DIASTOLIC BLOOD PRESSURE: 79 MMHG | TEMPERATURE: 98.1 F | RESPIRATION RATE: 16 BRPM | SYSTOLIC BLOOD PRESSURE: 122 MMHG | BODY MASS INDEX: 23.51 KG/M2 | OXYGEN SATURATION: 97 %

## 2023-06-19 PROCEDURE — 99213 OFFICE O/P EST LOW 20 MIN: CPT

## 2023-06-19 RX ORDER — GABAPENTIN 300 MG/1
300 CAPSULE ORAL
Qty: 30 | Refills: 5 | Status: ACTIVE | COMMUNITY
Start: 2023-06-19

## 2023-06-19 NOTE — PHYSICAL EXAM
[Fully active, able to carry on all pre-disease performance without restriction] : Status 0 - Fully active, able to carry on all pre-disease performance without restriction [Normal] : affect appropriate [de-identified] : L lumpectomy with hyperpigmentation and sensitivity to palpation, mild edema  [de-identified] : scratch marks over the chest wall

## 2023-06-19 NOTE — ASSESSMENT
[FreeTextEntry1] : She is a 76 y/o F with Stage I left breast cancer (T1N0) ER/IA positive and Huh9nwq negative. She had low clinical risk breast cancer and had genomic low risk Oncotype. She completed RT with Dr Cazares and has been on anastrozole since 4/2022. She has experienced worsening joint pain and insomnia while on anastrozole but would like to continue and does not want to trial a different AI. We reviewed supportive measures to decrease these symptoms. She will trial omega 3 again for joint pain and take gabapentin at night to help with pain and insomnia. We reviewed calcium and Vitamin D supplementation along with exercise to maintain bone health. Reviewed Prolia continuation if no invasive dental work. Will obtain copy of the blood work from PCP. She will be due for annual breast imaging 12/2023, script provided. Questions answered to her satisfaction. She is agreeable with plan. Next follow up in 6 months but earlier if any new symptoms.\par

## 2023-06-19 NOTE — REVIEW OF SYSTEMS
none [Fatigue] : fatigue [Insomnia] : insomnia [Negative] : Allergic/Immunologic [Fever] : no fever [Chills] : no chills [Night Sweats] : no night sweats [Suicidal] : not suicidal [Anxiety] : no anxiety [Depression] : no depression

## 2023-06-19 NOTE — HISTORY OF PRESENT ILLNESS
[Disease: _____________________] : Disease: [unfilled] [T: ___] : T[unfilled] [N: ___] : N[unfilled] [AJCC Stage: ____] : AJCC Stage: [unfilled] [de-identified] : Age 76: left breast cancer\par Screen detected: she had screening mammogram done on 1/31/2021 which showed left breast 3:00 6 cm FN 0.7 x 1.3 cm hypoechoic nodule for which 6 month follow up was set up. She had 6 month follow up evaluation and had L breast FNA showing malignant cells: invasive moderately differentiated ductal carcinoma measuring at least 0.4 cm , %, [place] 80%, Kko7cyy negative, Ki67 20%  and 5:00 nodular dense stromal fibrosis. She underwent left lumpectomy with SLNB with Dr Cheung on 1/18/2022. The pathology showed in the left posterior margin: atypical lobular hyperplasia; invasive moderately differentiated ductal carcinoma measuring 6 mm and no LVI; SBR 7/9, negative sentinel lymph nodes (0/2). She had Mammoprint done which showed high risk index of -0.029; luminal B type. We met with her and given low clinical risk, we sent OncotypeDx for further evaluation: score was 1 indicating 9 year distant recurrence to be 3% and chemotherapy benefit of less than 1%. She had RT with Dr Cazares 3/2022. She started on anastrozole 4/22/2022.  [de-identified] : invasive moderately differentiated ductal carcinoma %, [place] 80%, Ggu6eeo negative [de-identified] : anastrozole 4/2022 to present  [de-identified] : She takes anastrozole daily but continues to have insomnia and joint pain. She feels there side effects are tolerable and does not wish to trial a different AI. She has taken omega 3 in the past to help with joint pain but ran out of tablets, she would like to start this again. She was prescribed gabapentin by rheumatologist to help with nerve pain in her legs (primarily right) but is not sure it is helping. Currently taking 100 mg TID - she will trial 300 mg at night instead. She follows pulmonary for sarcoid and states she was recently diagnosed with COLLETTE a few months ago. She denies any cough or SOB. She is on Prolia and will be getting injection next week. She saw PCP a few months ago who did blood work, states everything was "good." She denies breast pain or chest wall changes. \par \par

## 2023-12-08 ENCOUNTER — APPOINTMENT (OUTPATIENT)
Dept: MAMMOGRAPHY | Facility: CLINIC | Age: 78
End: 2023-12-08
Payer: MEDICARE

## 2023-12-08 ENCOUNTER — APPOINTMENT (OUTPATIENT)
Dept: ULTRASOUND IMAGING | Facility: CLINIC | Age: 78
End: 2023-12-08
Payer: MEDICARE

## 2023-12-08 ENCOUNTER — RESULT REVIEW (OUTPATIENT)
Age: 78
End: 2023-12-08

## 2023-12-08 PROCEDURE — G0279: CPT

## 2023-12-08 PROCEDURE — 76641 ULTRASOUND BREAST COMPLETE: CPT | Mod: 50

## 2023-12-08 PROCEDURE — 77066 DX MAMMO INCL CAD BI: CPT

## 2023-12-14 ENCOUNTER — OUTPATIENT (OUTPATIENT)
Dept: OUTPATIENT SERVICES | Facility: HOSPITAL | Age: 78
LOS: 1 days | Discharge: HOME | End: 2023-12-14

## 2023-12-14 DIAGNOSIS — E89.2 POSTPROCEDURAL HYPOPARATHYROIDISM: Chronic | ICD-10-CM

## 2023-12-14 DIAGNOSIS — C50.919 MALIGNANT NEOPLASM OF UNSPECIFIED SITE OF UNSPECIFIED FEMALE BREAST: ICD-10-CM

## 2023-12-14 DIAGNOSIS — Z98.890 OTHER SPECIFIED POSTPROCEDURAL STATES: Chronic | ICD-10-CM

## 2023-12-15 ENCOUNTER — APPOINTMENT (OUTPATIENT)
Dept: HEMATOLOGY ONCOLOGY | Facility: CLINIC | Age: 78
End: 2023-12-15
Payer: MEDICARE

## 2023-12-15 PROCEDURE — 99214 OFFICE O/P EST MOD 30 MIN: CPT

## 2023-12-15 RX ORDER — ANASTROZOLE TABLETS 1 MG/1
1 TABLET ORAL DAILY
Qty: 1 | Refills: 1 | Status: DISCONTINUED | COMMUNITY
Start: 2022-04-20 | End: 2023-12-15

## 2023-12-15 RX ORDER — LETROZOLE TABLETS 2.5 MG/1
2.5 TABLET, FILM COATED ORAL DAILY
Qty: 90 | Refills: 1 | Status: ACTIVE | COMMUNITY
Start: 2023-12-15 | End: 1900-01-01

## 2023-12-15 NOTE — CONSULT LETTER
[Dear  ___] : Dear  [unfilled], [Courtesy Letter:] : I had the pleasure of seeing your patient, [unfilled], in my office today. [Please see my note below.] : Please see my note below. [Consult Closing:] : Thank you very much for allowing me to participate in the care of this patient.  If you have any questions, please do not hesitate to contact me. [Sincerely,] : Sincerely, [FreeTextEntry2] : Samantha Cheung  [facility] [address]  [place] [address] [FreeTextEntry3] : Tony Tang MD Attending [facility]

## 2023-12-15 NOTE — HISTORY OF PRESENT ILLNESS
[Disease: _____________________] : Disease: [unfilled] [T: ___] : T[unfilled] [N: ___] : N[unfilled] [AJCC Stage: ____] : AJCC Stage: [unfilled] [de-identified] : Age 76: left breast cancer Screen detected: she had screening mammogram done on 1/31/2021 which showed left breast 3:00 6 cm FN 0.7 x 1.3 cm hypoechoic nodule for which 6 month follow up was set up. She had 6 month follow up evaluation and had L breast FNA showing malignant cells: invasive moderately differentiated ductal carcinoma measuring at least 0.4 cm , %, [place] 80%, Qlg6vqs negative, Ki67 20%  and 5:00 nodular dense stromal fibrosis. She underwent left lumpectomy with SLNB with Dr Cheung on 1/18/2022. The pathology showed in the left posterior margin: atypical lobular hyperplasia; invasive moderately differentiated ductal carcinoma measuring 6 mm and no LVI; SBR 7/9, negative sentinel lymph nodes (0/2). She had Mammoprint done which showed high risk index of -0.029; luminal B type. We met with her and given low clinical risk, we sent OncotypeDx for further evaluation: score was 1 indicating 9 year distant recurrence to be 3% and chemotherapy benefit of less than 1%. She had RT with Dr Cazares 3/2022. She started on anastrozole 4/22/2022 to 12/2023. She had trial of letrozole 1/2024 due to leg pain/ sciatica on therapy.  [de-identified] : invasive moderately differentiated ductal carcinoma %, [place] 80%, Egy4fyg negative [de-identified] : anastrozole 4/2022 to 12/2022 letrozole 1/2024 to present  [de-identified] : She has been noticing sciatica over the R hip and noticing leg muscle discomfort: she feels it does not allow her to sleep well. Has baseline insomnia which also makes her feel tired. She is willing to switch to letrozole. She denies any new breast changes or new pain. She denies any new HA. Has recent dx of COLLETTE: will be monitored by pulmonary and will have CT chest next year.

## 2023-12-15 NOTE — PHYSICAL EXAM
[Fully active, able to carry on all pre-disease performance without restriction] : Status 0 - Fully active, able to carry on all pre-disease performance without restriction [Normal] : affect appropriate [de-identified] : L lumpectomy with hyperpigmentation and sensitivity to palpation  [de-identified] : scratch marks over the chest wall

## 2023-12-15 NOTE — REVIEW OF SYSTEMS
[Diarrhea: Grade 0] : Diarrhea: Grade 0 [Joint Pain] : joint pain [Joint Stiffness] : joint stiffness [Insomnia] : insomnia [Negative] : Allergic/Immunologic [Muscle Pain] : no muscle pain [Muscle Weakness] : no muscle weakness [Suicidal] : not suicidal [Anxiety] : no anxiety [Depression] : no depression

## 2023-12-15 NOTE — ASSESSMENT
[FreeTextEntry1] : She is a 78 y/o F with Stage I left breast cancer (T1N0) ER/VT positive and Jnt5yam negative. She had low clinical risk breast cancer and had genomic low risk Oncotype. She completed RT with Dr Cazares and has been on anastrozole since 4/2022 and will have trial off the anastrozole. She will start letrozole and try MWF x 2 weeks and if OK, will increase to daily. We reviewed supportive measures. Questions answered to her satisfaction. She is agreeable with plan. Next follow up in 6 months but earlier if any new symptoms.

## 2024-06-12 ENCOUNTER — OUTPATIENT (OUTPATIENT)
Dept: OUTPATIENT SERVICES | Facility: HOSPITAL | Age: 79
LOS: 1 days | Discharge: HOME | End: 2024-06-12

## 2024-06-12 DIAGNOSIS — Z98.890 OTHER SPECIFIED POSTPROCEDURAL STATES: Chronic | ICD-10-CM

## 2024-06-12 DIAGNOSIS — E89.2 POSTPROCEDURAL HYPOPARATHYROIDISM: Chronic | ICD-10-CM

## 2024-06-12 DIAGNOSIS — C50.919 MALIGNANT NEOPLASM OF UNSPECIFIED SITE OF UNSPECIFIED FEMALE BREAST: ICD-10-CM

## 2024-06-13 ENCOUNTER — NON-APPOINTMENT (OUTPATIENT)
Age: 79
End: 2024-06-13

## 2024-06-14 ENCOUNTER — APPOINTMENT (OUTPATIENT)
Dept: HEMATOLOGY ONCOLOGY | Facility: CLINIC | Age: 79
End: 2024-06-14
Payer: MEDICARE

## 2024-06-14 VITALS
RESPIRATION RATE: 16 BRPM | TEMPERATURE: 97.3 F | OXYGEN SATURATION: 98 % | HEIGHT: 64.76 IN | WEIGHT: 144.4 LBS | HEART RATE: 84 BPM | BODY MASS INDEX: 24.35 KG/M2 | SYSTOLIC BLOOD PRESSURE: 110 MMHG | DIASTOLIC BLOOD PRESSURE: 62 MMHG

## 2024-06-14 DIAGNOSIS — C50.912 MALIGNANT NEOPLASM OF UNSPECIFIED SITE OF LEFT FEMALE BREAST: ICD-10-CM

## 2024-06-14 DIAGNOSIS — E78.00 PURE HYPERCHOLESTEROLEMIA, UNSPECIFIED: ICD-10-CM

## 2024-06-14 DIAGNOSIS — Z17.0 MALIGNANT NEOPLASM OF UNSPECIFIED SITE OF LEFT FEMALE BREAST: ICD-10-CM

## 2024-06-14 PROCEDURE — G2211 COMPLEX E/M VISIT ADD ON: CPT

## 2024-06-14 PROCEDURE — 99214 OFFICE O/P EST MOD 30 MIN: CPT

## 2024-06-14 RX ORDER — SIMVASTATIN 40 MG/1
40 TABLET, FILM COATED ORAL
Qty: 90 | Refills: 1 | Status: DISCONTINUED | COMMUNITY
End: 2024-06-14

## 2024-06-14 RX ORDER — EZETIMIBE AND SIMVASTATIN 10; 40 MG/1; MG/1
10-40 TABLET ORAL
Refills: 0 | Status: ACTIVE | COMMUNITY
Start: 2024-06-14

## 2024-06-15 PROBLEM — E78.00 HYPERCHOLESTEREMIA: Status: ACTIVE | Noted: 2024-06-14

## 2024-06-15 NOTE — CONSULT LETTER
[Dear  ___] : Dear  [unfilled], [Courtesy Letter:] : I had the pleasure of seeing your patient, [unfilled], in my office today. [Please see my note below.] : Please see my note below. [Consult Closing:] : Thank you very much for allowing me to participate in the care of this patient.  If you have any questions, please do not hesitate to contact me. [Sincerely,] : Sincerely, [FreeTextEntry2] : Samantha Cheung  [facility] [address]  [place] [address] [FreeTextEntry3] : Tony Tang MD Attending [facility] [DrGemma  ___] : Dr. YEH

## 2024-06-15 NOTE — ASSESSMENT
[FreeTextEntry1] : She is a 77 y/o F with Stage I left breast cancer (T1N0) ER/IA positive and Vnh6kyq negative. She had low clinical risk breast cancer and had genomic low risk Oncotype. She completed RT with Dr Cazares and has been on anastrozole and then switched to letrozole. She has been on endocrine therapy since 2022 and wants to complete the 5 years of therapy. We reviewed her symptoms and she will keep diary of AE on letrozole with steroid taper. If AE returning, we will hold letrozole and she will see if AE from her baseline health versus from the medication. We reviewed switch to exemestane and we will touch base in 2 months to see how she is doing. We reviewed her imaging and blood work done by her rheumatologist. She is up to date with breast imaging. We reviewed calcium and Vitamin D supplementation along with exercise to maintain bone health. Questions answered to her satisfaction. She is agreeable with plan. Next follow up in 6 months but earlier if any new symptoms.

## 2024-06-15 NOTE — PHYSICAL EXAM
[Fully active, able to carry on all pre-disease performance without restriction] : Status 0 - Fully active, able to carry on all pre-disease performance without restriction [Normal] : grossly intact [de-identified] : L lumpectomy with hyperpigmentation and sensitivity to palpation  [de-identified] : scratch marks over the chest wall

## 2024-06-15 NOTE — REVIEW OF SYSTEMS
[Diarrhea: Grade 0] : Diarrhea: Grade 0 [Joint Pain] : joint pain [Joint Stiffness] : no joint stiffness [Muscle Pain] : muscle pain [Muscle Weakness] : no muscle weakness [Negative] : Allergic/Immunologic [FreeTextEntry9] : improved leg cramping on quinine, improved joint pain with steroids

## 2024-06-15 NOTE — HISTORY OF PRESENT ILLNESS
[Disease: _____________________] : Disease: [unfilled] [T: ___] : T[unfilled] [N: ___] : N[unfilled] [AJCC Stage: ____] : AJCC Stage: [unfilled] [de-identified] : Age 76: left breast cancer Screen detected: she had screening mammogram done on 1/31/2021 which showed left breast 3:00 6 cm FN 0.7 x 1.3 cm hypoechoic nodule for which 6 month follow up was set up. She had 6 month follow up evaluation and had L breast FNA showing malignant cells: invasive moderately differentiated ductal carcinoma measuring at least 0.4 cm , %, [place] 80%, Aso5ccc negative, Ki67 20%  and 5:00 nodular dense stromal fibrosis. She underwent left lumpectomy with SLNB with Dr Cheung on 1/18/2022. The pathology showed in the left posterior margin: atypical lobular hyperplasia; invasive moderately differentiated ductal carcinoma measuring 6 mm and no LVI; SBR 7/9, negative sentinel lymph nodes (0/2). She had Mammoprint done which showed high risk index of -0.029; luminal B type. We met with her and given low clinical risk, we sent OncotypeDx for further evaluation: score was 1 indicating 9 year distant recurrence to be 3% and chemotherapy benefit of less than 1%. She had RT with Dr Cazares 3/2022. She started on anastrozole 4/22/2022 to 12/2023. She had trial of letrozole 1/2024 due to leg pain/ sciatica on therapy.  [de-identified] : invasive moderately differentiated ductal carcinoma %, [place] 80%, Jem5hxp negative [de-identified] : anastrozole 4/2022 to 12/2023 letrozole 1/2024 to present  [de-identified] : Notices leg cramps on letrozole. Feels the leg cramps did not improve enough on the magnesium and her PCP suggested quinine: feels cramping improved. Had follow up CT of the chest showing stable changes. She also had MRI of the hip which showed degenerative fraying of the R anterior labrum and gluteal tendinoses. She has been on prednisone and feeling well: currently rheumatologist is tapering steroid. Wondering how her energy and joints will feel with the taper. She wants to remain on endocrine therapy but worried about switching to exemestane.

## 2024-06-17 ENCOUNTER — NON-APPOINTMENT (OUTPATIENT)
Age: 79
End: 2024-06-17

## 2024-06-17 LAB
ALBUMIN SERPL ELPH-MCNC: 4.5 G/DL
ALP BLD-CCNC: 66 U/L
ALT SERPL-CCNC: 32 U/L
ANION GAP SERPL CALC-SCNC: 18 MMOL/L
AST SERPL-CCNC: 35 U/L
BILIRUB SERPL-MCNC: 0.3 MG/DL
BUN SERPL-MCNC: 23 MG/DL
CALCIUM SERPL-MCNC: 9.9 MG/DL
CANCER AG27-29 SERPL-ACNC: 20.5 U/ML
CHLORIDE SERPL-SCNC: 102 MMOL/L
CO2 SERPL-SCNC: 21 MMOL/L
CREAT SERPL-MCNC: 1 MG/DL
EGFR: 57 ML/MIN/1.73M2
GLUCOSE SERPL-MCNC: 104 MG/DL
POTASSIUM SERPL-SCNC: 5 MMOL/L
PROT SERPL-MCNC: 7.5 G/DL
SODIUM SERPL-SCNC: 141 MMOL/L

## 2024-12-20 ENCOUNTER — APPOINTMENT (OUTPATIENT)
Dept: HEMATOLOGY ONCOLOGY | Facility: CLINIC | Age: 79
End: 2024-12-20

## (undated) DEVICE — DRAPE TOWEL BLUE 17" X 24"

## (undated) DEVICE — GLV 7.5 PROTEXIS (WHITE)

## (undated) DEVICE — DRAPE LIGHT HANDLE COVER (BLUE)

## (undated) DEVICE — DRAPE CHEST BREAST 106" X 122"

## (undated) DEVICE — DRSG OPSITE 2.5 X 2"

## (undated) DEVICE — DRAIN RESERVOIR FOR JACKSON PRATT 100CC CARDINAL

## (undated) DEVICE — SOL IRR POUR H2O 250ML

## (undated) DEVICE — WARMING BLANKET LOWER ADULT

## (undated) DEVICE — LIGASURE SMALL JAW

## (undated) DEVICE — DRSG CURITY GAUZE SPONGE 4 X 4" 12-PLY

## (undated) DEVICE — BLADE SCALPEL SAFETYLOCK #15

## (undated) DEVICE — DRAPE INSTRUMENT POUCH 6.75" X 11"

## (undated) DEVICE — BLADE PHOTON 7.5IN / 10.5IN

## (undated) DEVICE — STAPLER SKIN VISI-STAT 35 WIDE

## (undated) DEVICE — MEDICATION LABELS W MARKER

## (undated) DEVICE — DRAPE 3/4 SHEET 52X76"

## (undated) DEVICE — DRSG OPSITE 13.75 X 4"

## (undated) DEVICE — DRAPE MAYO STAND 30"

## (undated) DEVICE — SOL IRR POUR NS 0.9% 500ML

## (undated) DEVICE — SUCTION YANKAUER NO CONTROL VENT

## (undated) DEVICE — SPECIMEN CONTAINER 100ML

## (undated) DEVICE — SUT POLYSORB 3-0 30" V-20 UNDYED

## (undated) DEVICE — SYR ASEPTO

## (undated) DEVICE — DRSG TEGADERM 6"X8"

## (undated) DEVICE — BLADE SCALPEL SAFETYLOCK #10

## (undated) DEVICE — FOLEY TRAY 16FR 5CC LTX UMETER CLOSED

## (undated) DEVICE — SUT SOFSILK 2-0 18" C-23

## (undated) DEVICE — DRAIN JACKSON PRATT 10MM FLAT FULL NO TROCAR

## (undated) DEVICE — POSITIONER FOAM EGG CRATE ULNAR 2PCS (PINK)

## (undated) DEVICE — VENODYNE/SCD SLEEVE CALF MEDIUM

## (undated) DEVICE — PACK MAJOR ABDOMINAL WITH LAP

## (undated) DEVICE — PREP CHLORAPREP HI-LITE ORANGE 26ML

## (undated) DEVICE — SUT BIOSYN 4-0 18" P-12

## (undated) DEVICE — MARKING PEN W RULER

## (undated) DEVICE — DRAPE 1/2 SHEET 40X57"

## (undated) DEVICE — DRAIN BLAKE 15FR BARD CHANNEL

## (undated) DEVICE — DRSG STERISTRIPS 0.5 X 4"